# Patient Record
Sex: MALE | Race: ASIAN | NOT HISPANIC OR LATINO | Employment: UNEMPLOYED | URBAN - METROPOLITAN AREA
[De-identification: names, ages, dates, MRNs, and addresses within clinical notes are randomized per-mention and may not be internally consistent; named-entity substitution may affect disease eponyms.]

---

## 2021-04-22 ENCOUNTER — OFFICE VISIT (OUTPATIENT)
Dept: FAMILY MEDICINE | Facility: CLINIC | Age: 41
End: 2021-04-22
Payer: MEDICAID

## 2021-04-22 VITALS
DIASTOLIC BLOOD PRESSURE: 72 MMHG | SYSTOLIC BLOOD PRESSURE: 115 MMHG | HEIGHT: 63 IN | BODY MASS INDEX: 28.21 KG/M2 | HEART RATE: 59 BPM | WEIGHT: 159.2 LBS | OXYGEN SATURATION: 99 % | TEMPERATURE: 98.1 F

## 2021-04-22 DIAGNOSIS — R10.13 EPIGASTRIC PAIN: ICD-10-CM

## 2021-04-22 DIAGNOSIS — K21.9 GASTROESOPHAGEAL REFLUX DISEASE WITHOUT ESOPHAGITIS: Primary | ICD-10-CM

## 2021-04-22 DIAGNOSIS — A04.8 H. PYLORI INFECTION: ICD-10-CM

## 2021-04-22 PROCEDURE — 99204 OFFICE O/P NEW MOD 45 MIN: CPT | Performed by: INTERNAL MEDICINE

## 2021-04-22 PROCEDURE — T1013 SIGN LANG/ORAL INTERPRETER: HCPCS | Mod: GT | Performed by: INTERNAL MEDICINE

## 2021-04-22 RX ORDER — PANTOPRAZOLE SODIUM 40 MG/1
40 TABLET, DELAYED RELEASE ORAL DAILY
Qty: 30 TABLET | Refills: 1 | Status: SHIPPED | OUTPATIENT
Start: 2021-04-22 | End: 2021-12-15

## 2021-04-22 SDOH — HEALTH STABILITY: MENTAL HEALTH: HOW OFTEN DO YOU HAVE A DRINK CONTAINING ALCOHOL?: NOT ASKED

## 2021-04-22 SDOH — HEALTH STABILITY: MENTAL HEALTH: HOW MANY STANDARD DRINKS CONTAINING ALCOHOL DO YOU HAVE ON A TYPICAL DAY?: NOT ASKED

## 2021-04-22 SDOH — HEALTH STABILITY: MENTAL HEALTH: HOW OFTEN DO YOU HAVE 6 OR MORE DRINKS ON ONE OCCASION?: NOT ASKED

## 2021-04-22 ASSESSMENT — PAIN SCALES - GENERAL: PAINLEVEL: EXTREME PAIN (9)

## 2021-04-22 ASSESSMENT — MIFFLIN-ST. JEOR: SCORE: 1537.26

## 2021-04-22 NOTE — PROGRESS NOTES
"    Assessment & Plan     Gastroesophageal reflux disease without esophagitis  Suffering with reflux for a long time  Spicy foods make it worse  No history of any loss of weight  No history of any smoking or alcohol intake  No history of any melena  - pantoprazole (PROTONIX) 40 MG EC tablet; Take 1 tablet (40 mg) by mouth daily  - Helicobacter pylori Antigen Stool; Future    Epigastric pain  If symptoms are not improving after trial of Protonix and if the H. pylori bacteria is negative then we will consider EGD  - pantoprazole (PROTONIX) 40 MG EC tablet; Take 1 tablet (40 mg) by mouth daily  - Helicobacter pylori Antigen Stool; Future      25 minutes spent on the date of the encounter doing chart review, history and exam, documentation and further activities per the note       BMI:   Estimated body mass index is 28.2 kg/m  as calculated from the following:    Height as of this encounter: 1.6 m (5' 3\").    Weight as of this encounter: 72.2 kg (159 lb 3.2 oz).           Return in about 4 weeks (around 5/20/2021).    Robin Mcginnis MD  [facility] KALIA Ross is a 40 year old who presents for the following health issues  accompanied by phone :    HPI     Abdominal/Flank Pain  Onset/Duration: 2 years  Description:   Character: Sharp and Dull ache  Location: epigastric region  Radiation: None  Intensity: severe  Progression of Symptoms:  worsening and constant  Accompanying Signs & Symptoms:  Fever/Chills: YES- chills when its at its worst  Gas/Bloating: YES  Nausea: YES  Vomitting: YES  Diarrhea: YES  Constipation: no  Dysuria or Hematuria: no  History:   Trauma: no  Previous similar pain: no  Previous tests done: none  Precipitating factors:   Does the pain change with:     Food: YES- spicy foods and beer    Bowel Movement: no    Urination: no   Other factors:  no  Therapies tried and outcome: None, Patient would like to get testing done on abdominal today. " "          Review of Systems   Constitutional, HEENT, cardiovascular, pulmonary, gi and gu systems are negative, except as otherwise noted.      Objective    /72 (BP Location: Left arm, Patient Position: Sitting, Cuff Size: Adult Regular)   Pulse 59   Temp 98.1  F (36.7  C) (Oral)   Ht 1.6 m (5' 3\")   Wt 72.2 kg (159 lb 3.2 oz)   SpO2 99%   BMI 28.20 kg/m    Body mass index is 28.2 kg/m .  Physical Exam   GENERAL: healthy, alert and no distress  EYES: Eyes grossly normal to inspection, PERRL and conjunctivae and sclerae normal  RESP: lungs clear to auscultation - no rales, rhonchi or wheezes  CV: regular rate and rhythm, normal S1 S2, no S3 or S4, no murmur, click or rub, no peripheral edema and peripheral pulses strong  ABDOMEN: soft, nontender, no hepatosplenomegaly, no masses and bowel sounds normal  MS: no gross musculoskeletal defects noted, no edema  SKIN: no suspicious lesions or rashes  NEURO: Normal strength and tone, mentation intact and speech normal  PSYCH: mentation appears normal, affect normal/bright                "

## 2021-04-22 NOTE — PATIENT INSTRUCTIONS
Patient Education     Medicines for Acid Reflux  Your healthcare provider has told you that you have acid reflux. This condition causes stomach acid to wash up into your throat. For most people, acid reflux is troubling but not dangerous. But left untreated, it can sometimes damages the esophagus. Medicines can help control acid reflux and limit your risk of future problems.  Medicines for acid reflux  Your healthcare provider may prescribe medicine to help treat your acid reflux. Medicine will be based on your symptoms and any test results. Your provider will explain how to take your medicine. You will also be told about possible side effects.  Reducing stomach acid  Your provider may suggest antacids that you can buy over the counter. Antacids can give fast relief. Or you may be told to take a type of medicine called H2 blockers. These are available over-the-counter and by prescription (for higher doses).  Blocking stomach acid  In more severe cases, your healthcare provider may suggest stronger medicines such as proton pump inhibitors (PPIs). These keep the stomach from making acid. They are often prescribed for long-term use.  Other medicines  In some cases, medicines to reduce or block stomach acid may not work. Then you may be switched to another type of medicine that helps your stomach empty better.  [employer] last reviewed this educational content on 6/1/2019 2000-2021 [employer]. All rights reserved. This information is not intended as a substitute for professional medical care. Always follow your healthcare professional's instructions.           Patient Education     Medicines for GERD  Gastroesophageal reflux disease (GERD) can be treated with medicine. This may be done with a medicine you can buy over the counter. Or with a medicine that your healthcare provider has to prescribe. In some cases, both types may be used. Your provider will tell you what is best for your symptoms.    Antacids  Antacids work to weaken the acid in your stomach. They can give you quick relief. You can buy many of them with no prescription. Antacids can be high in sodium. This may be a problem if you have high blood pressure. Some antacids also have aluminum. This should be avoided if you have long-term (chronic) kidney disease. So check with your provider first. Take antacids only when you need to, as advised by your provider.   Side effects: Constipation, diarrhea. If you take too much medicine, it can cause calcium to build up.    H-2 blockers  These cause the stomach to make less acid. They are often used on demand as symptoms occur. And they are used daily to keep symptoms away. Your provider may prescribe them if antacids don t work for you. You can buy some of them over the counter. These come in a lower dosage.   Side effects: Confusion in older adults.   Proton-pump inhibitors  These also cause the stomach to make less acid. They reduce stomach acid more than H-2 blockers. They may be used for a short time, or longer to treat certain conditions. You can buy some of them over the counter. Or your provider may prescribe them. They help control GERD symptoms.   Side effects: Belly pain, diarrhea, upset stomach. Possible other side effects linked to long-term use and high doses.   Prokinetics  These medicines affect the movement of the digestive tract. They may be advised if your stomach is emptying too slowly. But in most cases they are not advised for treating GERD.   Side effects:Tiredness, depression, anxiety, problems with physical movement, belly cramps, constipation, diarrhea, a jittery feeling.   Medicines to stay away from  Don t take aspirin without your healthcare provider s approval. And don t take a nonsteroidal anti-inflammatory drug (NSAID), such as ibuprofen. These reduce the protective lining of your stomach. This can lead to more GERD symptoms. Check with your provider or pharmacist before  taking a new medicine.   [employer] last reviewed this educational content on 6/1/2019 2000-2021 [employer]. All rights reserved. This information is not intended as a substitute for professional medical care. Always follow your healthcare professional's instructions.

## 2021-04-23 DIAGNOSIS — K21.9 GASTROESOPHAGEAL REFLUX DISEASE WITHOUT ESOPHAGITIS: ICD-10-CM

## 2021-04-23 DIAGNOSIS — R10.13 EPIGASTRIC PAIN: ICD-10-CM

## 2021-04-23 PROCEDURE — 87338 HPYLORI STOOL AG IA: CPT | Performed by: INTERNAL MEDICINE

## 2021-04-23 PROCEDURE — T1013 SIGN LANG/ORAL INTERPRETER: HCPCS | Mod: GT

## 2021-04-26 LAB — H PYLORI AG STL QL IA: POSITIVE

## 2021-04-26 RX ORDER — CLARITHROMYCIN 500 MG
500 TABLET ORAL 2 TIMES DAILY
Qty: 28 TABLET | Refills: 0 | Status: SHIPPED | OUTPATIENT
Start: 2021-04-26 | End: 2021-08-23

## 2021-04-26 RX ORDER — PANTOPRAZOLE SODIUM 40 MG/1
40 TABLET, DELAYED RELEASE ORAL DAILY
Qty: 28 TABLET | Refills: 0 | Status: SHIPPED | OUTPATIENT
Start: 2021-04-26 | End: 2021-08-23

## 2021-04-26 RX ORDER — AMOXICILLIN 500 MG/1
1000 CAPSULE ORAL 2 TIMES DAILY
Qty: 56 CAPSULE | Refills: 0 | Status: SHIPPED | OUTPATIENT
Start: 2021-04-26 | End: 2021-05-10

## 2021-08-23 ENCOUNTER — OFFICE VISIT (OUTPATIENT)
Dept: URGENT CARE | Facility: URGENT CARE | Age: 41
End: 2021-08-23
Payer: COMMERCIAL

## 2021-08-23 VITALS
HEART RATE: 46 BPM | RESPIRATION RATE: 18 BRPM | TEMPERATURE: 97 F | WEIGHT: 157.6 LBS | SYSTOLIC BLOOD PRESSURE: 113 MMHG | DIASTOLIC BLOOD PRESSURE: 77 MMHG | OXYGEN SATURATION: 99 % | BODY MASS INDEX: 27.92 KG/M2

## 2021-08-23 DIAGNOSIS — R10.13 ABDOMINAL PAIN, CHRONIC, EPIGASTRIC: Primary | ICD-10-CM

## 2021-08-23 DIAGNOSIS — G89.29 ABDOMINAL PAIN, CHRONIC, EPIGASTRIC: Primary | ICD-10-CM

## 2021-08-23 DIAGNOSIS — Z86.19 HISTORY OF HELICOBACTER PYLORI INFECTION: ICD-10-CM

## 2021-08-23 PROCEDURE — 99213 OFFICE O/P EST LOW 20 MIN: CPT | Performed by: PHYSICIAN ASSISTANT

## 2021-08-23 ASSESSMENT — ENCOUNTER SYMPTOMS
CARDIOVASCULAR NEGATIVE: 1
CHILLS: 0
WHEEZING: 0
DIARRHEA: 0
FLANK PAIN: 0
FREQUENCY: 0
FEVER: 0
RESPIRATORY NEGATIVE: 1
CHEST TIGHTNESS: 0
FATIGUE: 0
HEMATOCHEZIA: 0
NAUSEA: 0
SHORTNESS OF BREATH: 0
VOMITING: 0
COUGH: 0
HEARTBURN: 1
HEMATURIA: 0
PALPITATIONS: 0
DYSURIA: 0
ABDOMINAL PAIN: 1
CONSTIPATION: 0

## 2021-08-23 ASSESSMENT — PAIN SCALES - GENERAL: PAINLEVEL: EXTREME PAIN (9)

## 2021-08-23 NOTE — PROGRESS NOTES
Arpan Colby is a 40 year old who presents for the following health issues  accompanied by his brother:  HPI   Abdominal/Flank Pain  Onset/Duration: chronic, 3-4years with worsening over the past 1year.  He is wanting a referral to imaging.  Description:   Character: Dull ache, burning  Location: epigastric region, early satiety but no difficulty swallowing  Radiation: None  Intensity: moderate  Progression of Symptoms:  intermittent  Accompanying Signs & Symptoms:  Fever/Chills: no  Gas/Bloating: no  Nausea: no  Vomitting: no  Diarrhea: no  Constipation: no  Dysuria or Hematuria: no  History:   Trauma: no  Previous similar pain: no  Previous tests done: labs and h pylori was positive and treated  Precipitating factors:   Does the pain change with:     Food: YES    Bowel Movement: no    Urination: no   Other factors:  no  Therapies tried and outcome: prevacid with some relief    There is no problem list on file for this patient.    Current Outpatient Medications   Medication     pantoprazole (PROTONIX) 40 MG EC tablet     No current facility-administered medications for this visit.      No Known Allergies    Review of Systems   Constitutional: Negative for chills, fatigue and fever.   Respiratory: Negative.  Negative for cough, chest tightness, shortness of breath and wheezing.    Cardiovascular: Negative.  Negative for chest pain, palpitations and peripheral edema.   Gastrointestinal: Positive for abdominal pain and heartburn. Negative for constipation, diarrhea, hematochezia, nausea and vomiting.   Genitourinary: Negative for discharge, dysuria, flank pain, frequency, hematuria, penile pain, testicular pain and urgency.   All other systems reviewed and are negative.           Objective    /77   Pulse (!) 46   Temp 97  F (36.1  C) (Tympanic)   Resp 18   Wt 71.5 kg (157 lb 9.6 oz)   SpO2 99%   BMI 27.92 kg/m    Body mass index is 27.92 kg/m .  Physical Exam  Vitals and nursing note reviewed.    Constitutional:       General: He is not in acute distress.     Appearance: Normal appearance. He is normal weight. He is not ill-appearing.   Skin:     General: Skin is warm and dry.   Neurological:      Mental Status: He is alert and oriented to person, place, and time.   Psychiatric:         Mood and Affect: Mood normal.         Behavior: Behavior normal.         Thought Content: Thought content normal.         Judgment: Judgment normal.          Assessment/Plan:  Abdominal pain, chronic, epigastric:  This is chronic and stable on prevacid.  He has been treated for h.pylori as well.  Will send to gastroenterology for further evaluation and management and/or endoscopy.  Avoid sour/spicy foods, caffeine, ETOH/tobacco, and NSAIDs as these will irritate her stomach.  Avoid fatty, greasy, oily foods.  Sleep with head of bed elevated and avoid eating prior to bedtime.  Recheck in clinic if symptoms worsen or if symptoms do not improve.   -     Adult Gastro Ref - Consult Only    History of Helicobacter pylori infection  -     Adult Gastro Ref - Consult Only        Nadia Daley PA-C

## 2021-09-16 NOTE — TELEPHONE ENCOUNTER
REFERRAL INFORMATION:    Referring Provider:  Nadia Daley PA-C    Referring [facility]:  [facility]     Reason for Visit/Diagnosis: Abdominal pain      FUTURE VISIT INFORMATION:    Appointment Date: 12/29/2021    Appointment Time: 3 PM      NOTES STATUS DETAILS   OFFICE NOTE from Referring Provider Internal 8/23/2021 Office visit with Nadia Daley PA-C     OFFICE NOTE from Other Specialist Internal 4/22/2021 Office visit with Dr. Robin Mcginnis ( [facility])     HOSPITAL DISCHARGE SUMMARY/  ED VISITS N/A    OPERATIVE REPORT N/A    MEDICATION LIST Internal         ENDOSCOPY  N/A    COLONOSCOPY N/A    ERCP N/A    EUS N/A    STOOL TESTING Internal 4/23/2021   PERTINENT LABS Internal    PATHOLOGY REPORTS (RELATED) N/A    IMAGING (CT, MRI, EGD, MRCP, Small Bowel Follow Through/SBT, MR/CT Enterography) N/A

## 2021-12-15 ENCOUNTER — TELEPHONE (OUTPATIENT)
Dept: GASTROENTEROLOGY | Facility: CLINIC | Age: 41
End: 2021-12-15

## 2021-12-15 ENCOUNTER — OFFICE VISIT (OUTPATIENT)
Dept: GASTROENTEROLOGY | Facility: CLINIC | Age: 41
End: 2021-12-15
Attending: PHYSICIAN ASSISTANT
Payer: COMMERCIAL

## 2021-12-15 VITALS
DIASTOLIC BLOOD PRESSURE: 74 MMHG | SYSTOLIC BLOOD PRESSURE: 118 MMHG | HEART RATE: 59 BPM | OXYGEN SATURATION: 100 % | WEIGHT: 155 LBS | BODY MASS INDEX: 27.46 KG/M2

## 2021-12-15 DIAGNOSIS — R10.13 EPIGASTRIC PAIN: ICD-10-CM

## 2021-12-15 DIAGNOSIS — K21.9 GASTROESOPHAGEAL REFLUX DISEASE WITHOUT ESOPHAGITIS: ICD-10-CM

## 2021-12-15 DIAGNOSIS — Z11.59 ENCOUNTER FOR SCREENING FOR OTHER VIRAL DISEASES: ICD-10-CM

## 2021-12-15 PROCEDURE — 99203 OFFICE O/P NEW LOW 30 MIN: CPT | Performed by: STUDENT IN AN ORGANIZED HEALTH CARE EDUCATION/TRAINING PROGRAM

## 2021-12-15 RX ORDER — PANTOPRAZOLE SODIUM 40 MG/1
40 TABLET, DELAYED RELEASE ORAL DAILY
Qty: 30 TABLET | Refills: 1 | Status: SHIPPED | OUTPATIENT
Start: 2021-12-15 | End: 2022-03-04

## 2021-12-15 ASSESSMENT — PAIN SCALES - GENERAL: PAINLEVEL: EXTREME PAIN (9)

## 2021-12-15 NOTE — PROGRESS NOTES
GI CLINIC VISIT    CC/REFERRING MD:  Nadia See Thuy  REASON FOR CONSULTATION:   Nadia See Thuy for   Chief Complaint   Patient presents with     New Patient     BNT / Abdominal pain       ASSESSMENT/PLAN:    Persistent Epigastric Pain   Hx of Hpylori Infection     41 year old patient with a hx of GERD and recently diagnosed Hpylori s/p triple therapy treatment presents today with report of persistent epigastric pain despite triple therapy treatment an continued PPI use. He reports associated nausea and vomiting. He reports weight loss but denies hematemesis, hematochezia or melena. He states pain is present with or without food.  He denies early satiety, post prandial fullness or bloating at this time. He denies diarrhea or constipation.     He has not been able to make it back to his PCP to recheck his Hpylori for confirmation of eradication.  He otherwise denies any concern at this time.     Given that patient is from [place] and has continues to have persistent epigastric pain, recently positive Hpylori and weight loss which is an alarm symptoms we will proceed with an EGD for further evaluation of patient's symptom and also plan to take Hpylori biopsies at the same time. Plan discussed with patient and patient agreeable with proceeding with an endoscopic evaluation. In the time being patient will continue his PPI which he states provides him with temporary relief.     Plan:  -EGD with plan for Hpylori biopsy  -Continue Protonix  daily       RTC: Return in about 6 months (around 6/15/2022).       30 minutes spent on the date of the encounter performing chart review, history and exam, documentation and further activities as noted above.  .      John Hills MD  Gastroenterology Fellow  Division of Gastroenterology, Hepatology and Nutrition  [facility]  P:         HPI  41 year old patient with a hx of GERD and recently diagnosed Hpylori s/p triple therapy treatment presents today with report  of persistent epigastric pain despite triple therapy treatment an continued PPI use.  He states pain has been ongoing for the last 3 years and has gotten progressively worse and has become concerning for him. he followed up with a primary care provider earlier in the year where he had an Hpylori testing done which resulted negative after which he was treated with triple therapy but has not been able to make it back to his doctor for a repeat testing to ensure eradication. He states his epigastric pain has persisted despite this treatment course.   He reports associated nausea and vomiting. He reports weight loss but denies hematemesis, hematochezia or melena. He states pain is present with or without food.  He denies early satiety, post prandial fullness or bloating at this time. He denies diarrhea or constipation.      ROS:    No fevers or chills  10 lbs weight loss  No blurry vision, double vision or change in vision  No sore throat  No lymphadenopathy  No headache, paraesthesias, or weakness in a limb  No shortness of breath or wheezing  No chest pain or pressure  No arthralgias or myalgias  No rashes or skin changes  No odynophagia or dysphagia  No BRBPR, hematochezia, melena  No dysuria, frequency or urgency  No hot/cold intolerance or polyria  No anxiety or depression    PROBLEM LIST  There are no problems to display for this patient.      PERTINENT PAST MEDICAL HISTORY:  No past medical history on file.    PREVIOUS SURGERIES:  No past surgical history on file.    PREVIOUS ENDOSCOPY:  None documented.     ALLERGIES:   No Known Allergies    PERTINENT MEDICATIONS:    Current Outpatient Medications:      pantoprazole (PROTONIX) 40 MG EC tablet, Take 1 tablet (40 mg) by mouth daily, Disp: 30 tablet, Rfl: 1    SOCIAL HISTORY:  Social History     Socioeconomic History     Marital status: Single     Spouse name: Not on file     Number of children: Not on file     Years of education: Not on file     Highest education  level: Not on file   Occupational History     Not on file   Tobacco Use     Smoking status: Never Smoker     Smokeless tobacco: Current User   Substance and Sexual Activity     Alcohol use: Yes     Drug use: Never     Sexual activity: Not on file   Other Topics Concern     Not on file   Social History Narrative     Not on file     Social Determinants of Health     Financial Resource Strain: Not on file   Food Insecurity: Not on file   Transportation Needs: Not on file   Physical Activity: Not on file   Stress: Not on file   Social Connections: Not on file   Intimate Partner Violence: Not on file   Housing Stability: Not on file       FAMILY HISTORY:  No family history on file.    Past/family/social history reviewed and no changes    PHYSICAL EXAMINATION:  Constitutional: aaox3, cooperative, pleasant, not dyspneic/diaphoretic, no acute distress  Vitals reviewed: /74   Pulse 59   Wt 70.3 kg (155 lb)   SpO2 100%   BMI 27.46 kg/m    Wt:   Wt Readings from Last 2 Encounters:   12/15/21 70.3 kg (155 lb)   08/23/21 71.5 kg (157 lb 9.6 oz)      Eyes: Sclera anicteric/injected  Ears/nose/mouth/throat: Mucus membranes moist, hearing intact  Neck: supple  CV: No edema  Respiratory: Unlabored breathing  Abd: Nondistended, +bs, no hepatosplenomegaly, nontender, no peritoneal signs  Skin: warm, perfused, no jaundice  Psych: Normal affect  MSK: Normal gait    PERTINENT STUDIES:    Orders Only on 04/23/2021   Component Date Value Ref Range Status     Helicobacter pylori Antigen Stool 04/23/2021 Positive* NEG^Negative Final

## 2021-12-15 NOTE — TELEPHONE ENCOUNTER
Screening Questions  1. Are you active on mychart? N    2. What insurance is in the chart? [employer]    2.  Ordering/Referring Provider: John Hills MD    3. BMI 27.8, If greater than 40 review exclusion criteria    4.  Respiratory Screening (If yes to any of the following Hospital setting only):     Do you use daily home oxygen? N  Do you have mod to severe Obstructive Sleep Apnea? N   Do you have Pulmonary Hypertension? N   Do you have UNCONTROLLED asthma? N    5. Have you had a heart or lung transplant (If yes, please review exclusion criteria) ? N    6. Are you currently on dialysis or have chronic kidney disease? N    7. Have you had a stroke or Transient ischemic attack (TIA) within 6 months? N    8. In the past 6 months, have you had any heart related issues including cardiomyopathy or heart attack? N                 If yes, did it require cardiac stenting or other implantable device?      9. Do you have any implantable devices in your body (pacemaker, defib, LVAD)? N    10. Do you take nitroglycerin? If yes, how often? N    11. Are you currently taking any blood thinners?N    12. Are you a diabetic? N    13. (Females) Are you currently pregnant?   If yes, how many weeks?      15. Are you taking any prescription pain medications on a routine schedule? N If yes, MAC sedation.    16. Do you have any chemical dependencies such as alcohol, street drugs, or methadone? NIf yes, MAC sedation.    17. Do you have any history of post-traumatic stress syndrome, severe anxiety or history of psychosis? N    18. Do you transfer independently? Y    19.  Do you have any issues with constipation?     20. Preferred Pharmacy for Pre Prescription [facility]    Scheduling Details    Which Colonoscopy Prep was Sent?:   Procedure Scheduled: EGD  Surgeon: AURY  Date of Procedure: 1/3  Location: [facility]  Caller (Please ask for phone number if not scheduled by patient):       Sedation Type: CS  Conscious Sedation- Needs  for 6 hours  after the procedure  MAC/General-Needs  for 24 hours after procedure    Pre-op Required at [facility] [place], [place] and OR for MAC sedation:   (if yes advise patient they will need a pre-op prior to procedure)      Is patient on blood thinners? -N (If yes- inform patient to follow up with PCP or provider for follow up instructions)     Informed patient they will need an adult  Y  Cannot take any type of public or medical transportation alone    Pre-Procedure Covid test to be completed at [facility] or Externally: 12/30    Confirmed Nurse will call to complete assessment Y    Additional comments:

## 2021-12-15 NOTE — NURSING NOTE
Chief Complaint   Patient presents with     New Patient     BNT / Abdominal pain       Vitals:    12/15/21 1103   BP: 118/74   Pulse: 59   SpO2: 100%   Weight: 70.3 kg (155 lb)       Body mass index is 27.46 kg/m .    Naima Thomson MA

## 2021-12-15 NOTE — PATIENT INSTRUCTIONS
Marco Antonio Mr Durán, it was no nice meeting you today.       -As discussed during your visit today, we will proceed with an upper endoscopy to further evaluate your symptom. We will also plan to take biopsies at the same time to recheck H pylori bacteria.       - We will give you a call with the biopsy results.   -Please continue your Protonix every morning 30 minutes before breakfast     If questions please call  Cheryl Butler        You are scheduled for your upper endoscopy on Jan3   Check in time is 930   [facility]   [address] [place] [address]    You will be sent a letter with the information also

## 2021-12-15 NOTE — NURSING NOTE
Upper endoscopy  Scheduled for patient  Message sent to sign up for my chart   Covid test scheduled  After visit summary given to patient along with verbal instructions via    Patient's brother reads english

## 2021-12-15 NOTE — LETTER
12/15/2021         RE: Lasha Durán  [address]  [facility] [address]        Dear Colleague,    Thank you for referring your patient, Lasha Durán, to the [facility]STRO[facility] [place]. Please see a copy of my visit note below.    GI CLINIC VISIT    CC/REFERRING MD:  Nadia Daley  REASON FOR CONSULTATION:   Nadia Daley for   Chief Complaint   Patient presents with     New Patient     BNT / Abdominal pain       ASSESSMENT/PLAN:    Persistent Epigastric Pain   Hx of Hpylori Infection     41 year old patient with a hx of GERD and recently diagnosed Hpylori s/p triple therapy treatment presents today with report of persistent epigastric pain despite triple therapy treatment an continued PPI use. He reports associated nausea and vomiting. He reports weight loss but denies hematemesis, hematochezia or melena. He states pain is present with or without food.  He denies early satiety, post prandial fullness or bloating at this time. He denies diarrhea or constipation.     He has not been able to make it back to his PCP to recheck his Hpylori for confirmation of eradication.  He otherwise denies any concern at this time.     Given that patient is from [place] and has continues to have persistent epigastric pain, recently positive Hpylori and weight loss which is an alarm symptoms we will proceed with an EGD for further evaluation of patient's symptom and also plan to take Hpylori biopsies at the same time. Plan discussed with patient and patient agreeable with proceeding with an endoscopic evaluation. In the time being patient will continue his PPI which he states provides him with temporary relief.     Plan:  -EGD with plan for Hpylori biopsy  -Continue Protonix  daily       RTC: Return in about 6 months (around 6/15/2022).       30 minutes spent on the date of the encounter performing chart review, history and exam, documentation and further activities as noted  above.  .      John Hills MD  Gastroenterology Fellow  Division of Gastroenterology, Hepatology and Nutrition  [facility]  P:         HPI  41 year old patient with a hx of GERD and recently diagnosed Hpylori s/p triple therapy treatment presents today with report of persistent epigastric pain despite triple therapy treatment an continued PPI use.  He states pain has been ongoing for the last 3 years and has gotten progressively worse and has become concerning for him. he followed up with a primary care provider earlier in the year where he had an Hpylori testing done which resulted negative after which he was treated with triple therapy but has not been able to make it back to his doctor for a repeat testing to ensure eradication. He states his epigastric pain has persisted despite this treatment course.   He reports associated nausea and vomiting. He reports weight loss but denies hematemesis, hematochezia or melena. He states pain is present with or without food.  He denies early satiety, post prandial fullness or bloating at this time. He denies diarrhea or constipation.      ROS:    No fevers or chills  10 lbs weight loss  No blurry vision, double vision or change in vision  No sore throat  No lymphadenopathy  No headache, paraesthesias, or weakness in a limb  No shortness of breath or wheezing  No chest pain or pressure  No arthralgias or myalgias  No rashes or skin changes  No odynophagia or dysphagia  No BRBPR, hematochezia, melena  No dysuria, frequency or urgency  No hot/cold intolerance or polyria  No anxiety or depression    PROBLEM LIST  There are no problems to display for this patient.      PERTINENT PAST MEDICAL HISTORY:  No past medical history on file.    PREVIOUS SURGERIES:  No past surgical history on file.    PREVIOUS ENDOSCOPY:  None documented.     ALLERGIES:   No Known Allergies    PERTINENT MEDICATIONS:    Current Outpatient Medications:      pantoprazole  (PROTONIX) 40 MG EC tablet, Take 1 tablet (40 mg) by mouth daily, Disp: 30 tablet, Rfl: 1    SOCIAL HISTORY:  Social History     Socioeconomic History     Marital status: Single     Spouse name: Not on file     Number of children: Not on file     Years of education: Not on file     Highest education level: Not on file   Occupational History     Not on file   Tobacco Use     Smoking status: Never Smoker     Smokeless tobacco: Current User   Substance and Sexual Activity     Alcohol use: Yes     Drug use: Never     Sexual activity: Not on file   Other Topics Concern     Not on file   Social History Narrative     Not on file     Social Determinants of Health     Financial Resource Strain: Not on file   Food Insecurity: Not on file   Transportation Needs: Not on file   Physical Activity: Not on file   Stress: Not on file   Social Connections: Not on file   Intimate Partner Violence: Not on file   Housing Stability: Not on file       FAMILY HISTORY:  No family history on file.    Past/family/social history reviewed and no changes    PHYSICAL EXAMINATION:  Constitutional: aaox3, cooperative, pleasant, not dyspneic/diaphoretic, no acute distress  Vitals reviewed: /74   Pulse 59   Wt 70.3 kg (155 lb)   SpO2 100%   BMI 27.46 kg/m    Wt:   Wt Readings from Last 2 Encounters:   12/15/21 70.3 kg (155 lb)   08/23/21 71.5 kg (157 lb 9.6 oz)      Eyes: Sclera anicteric/injected  Ears/nose/mouth/throat: Mucus membranes moist, hearing intact  Neck: supple  CV: No edema  Respiratory: Unlabored breathing  Abd: Nondistended, +bs, no hepatosplenomegaly, nontender, no peritoneal signs  Skin: warm, perfused, no jaundice  Psych: Normal affect  MSK: Normal gait    PERTINENT STUDIES:    Orders Only on 04/23/2021   Component Date Value Ref Range Status     Helicobacter pylori Antigen Stool 04/23/2021 Positive* NEG^Negative Final     Again, thank you for allowing me to participate in the care of your patient.      Sincerely,    John  MD Jeana

## 2021-12-16 ENCOUNTER — TELEPHONE (OUTPATIENT)
Dept: GASTROENTEROLOGY | Facility: CLINIC | Age: 41
End: 2021-12-16
Payer: COMMERCIAL

## 2021-12-16 NOTE — LETTER
December 20, 2021      Lasha S Luis Armando  [address] [facility] [address]              Dear Lasha,        Instructions for Your Upper Endoscopy  Your exam is on 1/3/22 Arrival Time: 9:30AM  Please note that your procedure time may change  Check in at: [facility]; [address] [place] [address]. [address], [place] [address]     What is an upper endoscopy?  - This is an exam that checks for problems linked to heartburn, swallowing or belly (abdominal) pain or other symptoms of the upper GI tract. Depending on your symptoms, the doctor will look at your esophagus (food pipe), stomach and the part of the stomach that enters the small intestine.      Getting ready  - You must arrange for an adult to drive you home and stay with you after your exam. This person will need to stay with you for 24 hours unless your provider says otherwise.   - Your exam cannot be done unless you have proper transportation. If you need to use public transportation someone must ride with you.  - Dress in comfortable, loose clothing.  - Bring your insurance card. Leave your purse, billfold, credit cards and other valuables at home.  - Bring a list of your medicines and known allergies. If you have a pacemaker or ICD, please bring your information card.  - We do our best to stay on time, but there may be a delay. Please bring something to pass the time, such as a newspaper or book.     - Important: You must complete all steps before the exam.      Seven days before the exam - Date: 12/27  - Talk to your doctor: If you take blood-thinners (such as Coumadin, Plavix, Xarelto), your prescription or schedule may need to change before the test.  - Continue taking prescribed aspirin; talk to your prescribing doctor with any concerns.     - If you have diabetes: Ask to have your exam early in the morning. Also, ask your doctor if you should change your diet or medicines     One day before the exam - Date: 1/2  -Stop eating all solid  foods at 10 p.m. You may drink clear liquids.      Day of the exam - Date: 1/3  -You may drink clear liquids until 6 hours before your exam.  -You may take all of your morning medicines (except for diabetes pills) as usual with 4 oz. of water up to 6 hours before your test.  -If you take diabetes medicine (pills): do not take them the morning of your test.  -Bring a list of your medicines and known allergies.  -Please arrive with an adult who can take you home after the test: The medicine will make you sleepy. If you do not have a , we may cancel your test.          What are clear liquids?   You may have:  - Water, tea, coffee (no cream)  - Soda pop, Gatorade   - Clear nutrition drinks (Enlive, Resource Breeze)   - Jell-O, Popsicles (no milk or fruit pieces) or sorbet   - Fat-free soup broth or bouillon  - Plain hard cand, such as clear life savers   - Clear juices and fruit-flavored drinks such as apple juice, white grape juice, Hi-C and David-Aid     Do not have:  - Milk or milk products such as ice cream, malts or shakes  - Juices with pulp such as orange, grapefruit, pineapple or tomato juice  - Cream soups of any kind  - Alcohol         During the exam  - The exam lasts from 10 to 20 minutes.   - We will review the risks and benefits of the exam. We will then ask you to sign a consent form.  - We will place a small needle (IV) in your hand or arm. We will give you medicine through the IV to keep you comfortable.   -We will spray a numbing medicine into your throat. This will reduce gagging.   - We will help you swallow a flexible tube (the endoscope). You may feel some discomfort at first. The tube will not get in the way of your breathing.  - You will not be able to talk with the endoscope in your mouth. Use hand signals instead.  - When we put air into your stomach, you may feel full or have mild cramps. We will remove the air at the end of the exam.  - To reduce discomfort, breathe at a slow, even pace.  Try to relax the muscles in your neck and shoulders.  - We may take a small piece of tissue (a biopsy) to test in the lab. It will not hurt. We may also take pictures of your insides.      After the exam  - You will rest in the recovery area until you feel ready to leave. This takes about 30 to 60 minutes.   - We will remove the IV.  - You may burp up air left in your stomach.  - You may feel drowsy or a little dizzy from the medicine.   - Your doctor will discuss your results. You will receive your test results in 7 to 10 business days by letter or MyChart.   - Your throat may feel numb. One hour after the exam, swallow a small amount of cool water. If you can swallow easily, you may go back to your regular diet and medicines.  - Your throat may be sore for the rest of the day. Throat lozenges or ice chips may help.  - Do not drive for 24 hours.     Call us at once if you have:  - Unusual pain or problems swallowing, unusual stomach or chest pain.  - Vomit that looks like coffee grounds or black or bloody stools (bowel movements).  - A fever above 100.6  F (37.5  C) when taken under the tongue.     Test results  - You will receive your results in 7 to 10 business days by phone, letter or MyChart.     Schedule your Covid Test: Your covid test is scheduled on 12/30 @2:45 at the [facility] ([address] [place] [id number]).   Please ensure your COVID test is scheduled within 96 hours or 4 days of your procedure. If you have not been contacted to schedule please call [phone].     For questions or appointments, call:  [facility]   [phone], option 2  Monday through Friday, 8 a.m. to 4:30 p.m.  (If it is after hours, call [phone]. Ask for the GI fellow resident on call.)     You should be aware that your endoscopist may be a part of a study to improve endoscopy procedures.  As part of a study, pictures gathered from your procedure may be stored  and analyzed in a de-identified manner.

## 2021-12-16 NOTE — TELEPHONE ENCOUNTER
Patient scheduled for EGD on 1.3.2022.     Covid test scheduled: 12.30.2021    Arrival time: 0930    Facility location: [place]    Sedation type: MAC    Indication for procedure: Persistent epigastri pain/stool positive Hpylori s/p triple therapy. Pt still with epigastric pain. EGD for evaluation and Hpylori biopsies    Referring provider: John Hills MD    Pre visit planning completed.    Marjan Celestin RN

## 2021-12-20 NOTE — TELEPHONE ENCOUNTER
Pre assessment questions completed for upcoming EGD procedure scheduled on 1.3.2022    COVID test scheduled 12.30.2021    Reviewed procedural arrival time 0930 and facility location [facility].    Designated  policy reviewed. Instructed to have someone stay 24 hours post procedure.     Pt stated that he wanted conscious sedation.  RN informed pt that [facility] does procedure with MAC however pt could be r/s for CS at [facility].  Pt declined and would like to keep his appt as scheduled.    Anticoagulation/blood thinners? no    Electronic implanted devices? no    Reviewed EGD prep instructions with patient.     Patient verbalized understanding and had no questions or concerns at this time.    Marjan Celestin RN

## 2021-12-29 ENCOUNTER — PRE VISIT (OUTPATIENT)
Dept: GASTROENTEROLOGY | Facility: CLINIC | Age: 41
End: 2021-12-29

## 2021-12-30 ENCOUNTER — LAB (OUTPATIENT)
Dept: LAB | Facility: CLINIC | Age: 41
End: 2021-12-30
Attending: INTERNAL MEDICINE
Payer: COMMERCIAL

## 2021-12-30 DIAGNOSIS — Z11.59 ENCOUNTER FOR SCREENING FOR OTHER VIRAL DISEASES: ICD-10-CM

## 2021-12-30 PROCEDURE — U0003 INFECTIOUS AGENT DETECTION BY NUCLEIC ACID (DNA OR RNA); SEVERE ACUTE RESPIRATORY SYNDROME CORONAVIRUS 2 (SARS-COV-2) (CORONAVIRUS DISEASE [COVID-19]), AMPLIFIED PROBE TECHNIQUE, MAKING USE OF HIGH THROUGHPUT TECHNOLOGIES AS DESCRIBED BY CMS-2020-01-R: HCPCS

## 2021-12-30 PROCEDURE — U0005 INFEC AGEN DETEC AMPLI PROBE: HCPCS

## 2021-12-31 LAB — SARS-COV-2 RNA RESP QL NAA+PROBE: NEGATIVE

## 2022-01-03 ENCOUNTER — TRANSFERRED RECORDS (OUTPATIENT)
Dept: HEALTH INFORMATION MANAGEMENT | Facility: CLINIC | Age: 42
End: 2022-01-03
Payer: COMMERCIAL

## 2022-01-03 ENCOUNTER — DOCUMENTATION ONLY (OUTPATIENT)
Dept: GASTROENTEROLOGY | Facility: OUTPATIENT CENTER | Age: 42
End: 2022-01-03
Payer: COMMERCIAL

## 2022-01-03 DIAGNOSIS — R10.13 EPIGASTRIC PAIN: ICD-10-CM

## 2022-01-03 PROCEDURE — 88305 TISSUE EXAM BY PATHOLOGIST: CPT | Mod: TC,ORL | Performed by: INTERNAL MEDICINE

## 2022-01-03 PROCEDURE — 88342 IMHCHEM/IMCYTCHM 1ST ANTB: CPT | Mod: 26 | Performed by: PATHOLOGY

## 2022-01-03 PROCEDURE — 88305 TISSUE EXAM BY PATHOLOGIST: CPT | Mod: 26 | Performed by: PATHOLOGY

## 2022-01-04 ENCOUNTER — LAB REQUISITION (OUTPATIENT)
Dept: LAB | Facility: CLINIC | Age: 42
End: 2022-01-04
Payer: COMMERCIAL

## 2022-01-04 NOTE — LETTER
"January 7, 2022      Lasha Durán  [address] [address]        Dear ,    We are writing to inform you of your test results.    {results letter list:986201}    Resulted Orders   Surgical Pathology Exam   Result Value Ref Range    Case Report       Surgical Pathology Report                         Case: [id number]                                  Authorizing Provider:  Rasheed Richardson MD   Collected:           01/03/2022 10:45 AM          Ordering Location:     [facility]     Received:            01/04/2022 09:19 AM                                 [facility]                                                       Pathologist:           Sammie Jonas MD                                                   Specimens:   A) - Small Intestine, Duodenum, Duodenal biopsy                                                     B) - Stomach, Antrum, Gastric biopsy                                                       Final Diagnosis       A. Duodenal biopsy:  - Duodenal mucosa with normal villous architecture and mildly increased intraepithelial lymphocytes  - Negative for dysplasia or malignancy  - See comment    B. Gastric biopsy:  - Moderate chronic inactive gastritis  - Positive for H. Pylori by immunohistochemistry  - Negative for intestinal metaplasia or dysplasia      Comment       The mildly increased intraepithelial duodenal lymphocytes in part A is a nonspecific finding and can be seen in association with a variety disorders/conditions including early celiac disease, nongluten protein enteropathy, various drugs including NSAIDs, autoimmune disorders, bacterial overgrowth and Helicobacter pylori infection.       Clinical Information       Dyspepsia.  Follow-up H. pylori.  Weight loss      Gross Description       A(). Small Intestine, Duodenum, Duodenal biopsy:  The specimen is received in formalin with proper patient identification, labeled \"duodenum " "biopsy\".  The specimen consists of 5 tan-white soft tissue fragments, 0.2 cm in greatest dimension.  Entirely submitted in A1.     B(). Stomach, Antrum, Gastric biopsy:  The specimen is received in formalin with proper patient identification, labeled \"stomach biopsy\".  The specimen consists of multiple tan-white soft tissue fragments ranging from 0.2 to 0.3 cm in greatest dimension.  Entirely submitted in B1.         Microscopic Description       Microscopic examination is performed.         Performing Labs       The technical component of this testing was completed at [facility]      Case Images         If you have any questions or concerns, please call the clinic at the number listed above.       Sincerely,      Rasheed Richardson MD          "

## 2022-01-06 LAB
PATH REPORT.COMMENTS IMP SPEC: NORMAL
PATH REPORT.FINAL DX SPEC: NORMAL
PATH REPORT.GROSS SPEC: NORMAL
PATH REPORT.MICROSCOPIC SPEC OTHER STN: NORMAL
PATH REPORT.RELEVANT HX SPEC: NORMAL
PHOTO IMAGE: NORMAL

## 2022-01-07 ENCOUNTER — TELEPHONE (OUTPATIENT)
Dept: FAMILY MEDICINE | Facility: CLINIC | Age: 42
End: 2022-01-07
Payer: COMMERCIAL

## 2022-01-07 NOTE — TELEPHONE ENCOUNTER
Rasheed Richardson MD did procedure. RN routed to GI [facility] pool.    Pauline Miller RN, BSN  Gastroenterology Nurse Care Coordinator  Phone: [phone]  Fax: [phone]

## 2022-01-07 NOTE — TELEPHONE ENCOUNTER
Patient is calling to request results of upper GI endoscopy. Routing to GI team to provide interpretation of results and notify patient.       Patient is also looking to establish care as he does not have a primary care provider.    Transferred patient to central scheduling to assist with appointment set up.    Brittney Duncan RN  [facility]

## 2022-01-07 NOTE — RESULT ENCOUNTER NOTE
Called to discuss results, but he could not understand without .     Will have refer to MTM for H pylori treatment.     Will need  for MTM visit.

## 2022-01-13 ENCOUNTER — APPOINTMENT (OUTPATIENT)
Dept: INTERPRETER SERVICES | Facility: CLINIC | Age: 42
End: 2022-01-13
Payer: COMMERCIAL

## 2022-01-13 ENCOUNTER — VIRTUAL VISIT (OUTPATIENT)
Dept: PHARMACY | Facility: CLINIC | Age: 42
End: 2022-01-13
Attending: INTERNAL MEDICINE
Payer: COMMERCIAL

## 2022-01-13 DIAGNOSIS — A04.8 H. PYLORI INFECTION: Primary | ICD-10-CM

## 2022-01-13 PROCEDURE — 99605 MTMS BY PHARM NP 15 MIN: CPT | Performed by: PHARMACIST

## 2022-01-13 PROCEDURE — 99607 MTMS BY PHARM ADDL 15 MIN: CPT | Performed by: PHARMACIST

## 2022-01-13 RX ORDER — METRONIDAZOLE 500 MG/1
500 TABLET ORAL 4 TIMES DAILY
Qty: 56 TABLET | Refills: 0 | Status: SHIPPED | OUTPATIENT
Start: 2022-01-13 | End: 2022-02-03

## 2022-01-13 RX ORDER — BISMUTH SUBSALICYLATE 262 MG/1
2 TABLET, CHEWABLE ORAL
Qty: 112 TABLET | Refills: 0 | Status: SHIPPED | OUTPATIENT
Start: 2022-01-13 | End: 2022-02-03

## 2022-01-13 RX ORDER — TETRACYCLINE HYDROCHLORIDE 500 MG/1
500 CAPSULE ORAL 4 TIMES DAILY
Qty: 56 CAPSULE | Refills: 0 | Status: SHIPPED | OUTPATIENT
Start: 2022-01-13 | End: 2022-02-03

## 2022-01-13 RX ORDER — PANTOPRAZOLE SODIUM 40 MG/1
40 TABLET, DELAYED RELEASE ORAL 2 TIMES DAILY
Qty: 28 TABLET | Refills: 0 | Status: SHIPPED | OUTPATIENT
Start: 2022-01-13 | End: 2022-02-03

## 2022-01-13 NOTE — PROGRESS NOTES
Medication Therapy Management (MTM) Encounter    ASSESSMENT:                            Medication Adherence/Access: No issues identified    H pylori: Lasha would benefit from re-treatment of H pylori given continued positive result. We discussed persistent versus re-infection and consideration for testing those in his household. Would recommend bismuth quadruple therapy for salvage regimen. In the event tetracycline is not available/covered we can use doxycycline. Will use higher dose metronidazole strategy given potential failure of triple therapy. Discussed confirmatory testing via stool antigen to confirm success at least four weeks after completion of the regimen. He is on chronic PPI therapy with pantoprazole, therefore we will plan to use this twice daily during treatment and then he can return to daily dosing afterwards. We reviewed the need to hold this prior to stool antigen re-testing, which I will discuss again with him at follow-up. He is only on pantoprazole at this time, therefore there are no major drug-drug interactions of concern.    PLAN:                            1. Recommend bismuth quadruple therapy x 14 days:   - pantoprazole 40 mg by mouth twice daily (may return to daily dosing after treatment - - clarified that he should not take both pantoprazole prescriptions)   - bismuth subsalicylate 524 mg by mouth four times daily    - tetracycline 500 mg by mouth four times daily    - metronidazole 500 mg by mouth four times daily     -- preference to [facility] for orders, sent per CPA with Dr. Richardson     Follow-up:    -- 7-10 days for treatment check-in (1/20 1:00 PM scheduled)   -- at least four weeks after treatment completion for repeat stool antigen testing     SUBJECTIVE/OBJECTIVE:                          Lasha Durán is a 41 year old male called for an initial visit. He was referred to me from Dr. Richardson.  (ID# [facility] aditya Interpret) was used during today's  visit.  : Ash Garrido     Reason for visit: H pylori therapy    Allergies/ADRs: Reviewed in chart  Tobacco: He reports that he has never smoked. He uses smokeless tobacco.  Alcohol: none    Medication Adherence/Access: no issues reported    H pylori:    Pantoprazole 40 mg daily     Recent EGD with Dr. Richardson with evidence of H pylori. He is unfamiliar with this, which we discussed today. He inquires about whether or not this can be treated, which we also discussed. Per chart review, he had a positive stool antigen test back in April. I asked if he remembers this, and he states that he does and he was able to complete treatment. He was prescribed clarithromycin based triple therapy at that time. He does remember taking some medications before, but he did not get any refills of this.     The [facility]  phone number was given to the patient in the event he is not able to pickup/tolerate the medications. We discussed bismuth quadruple therapy today as a salvage regimen for him including medications, mechanism of action, general dosing and counseling information as well as precautions. We reviewed different types of medication induced rashes that may occur and how to triage them appropriately. We also discussed that pepto bismol may cause the stools/tongue to be darker. We reviewed the interaction between metronidazole and alcohol, though he is not using any alcohol at this time. He notes that he cannot tolerate alcohol, spicy foods, acidic foods and is wondering if he should eat a specific diet while on treatment. We discussed potential interaction between foods high in calcium and doxycycline/tetracycline, but other than that (and alcohol) he can mainly eat what he is able to tolerate.     Notes that five people live at home with him. He is not aware any of them have been tested or treated for H pylori before.     He asks about refills if this is not effective, and we discussed the plan for repeat  testing. We would likely need to use an alternate regimen if he continues to test positive. He is wondering if he should hold the pantoprazole he is using right now. To avoid confusion, I directed him to take the pantoprazole which I will order, and hold his current pantoprazole to avoid triple dosing. He has several questions regarding ulcers which were discussed today.    ----------------    I spent 47 minutes with this patient today. All changes were made via collaborative practice agreement with Dr. Richardson. A copy of the visit note was provided to the patient's provider(s).    The patient declined a summary of these recommendations.     Devi MathewsD, BCACP  MTM Pharmacist   [facility] Gastroenterology and Rheumatology  Phone: [phone]    Telemedicine Visit Details  Type of service:  Telephone visit  Start Time: 11:06 AM  End Time: 11:53 AM  Originating Location (patient location): [place]  Distant Location (provider location):  [facility] MTM     Medication Therapy Recommendations  H. pylori infection    Rationale: Untreated condition - Needs additional medication therapy - Indication   Recommendation: Start Medication   Status: Accepted per CPA   Note: Recommend bismuth quadruple therapy for 14 days for H pylori treatment.

## 2022-01-19 ENCOUNTER — VIRTUAL VISIT (OUTPATIENT)
Dept: FAMILY MEDICINE | Facility: CLINIC | Age: 42
End: 2022-01-19
Payer: COMMERCIAL

## 2022-01-19 DIAGNOSIS — A04.8 H. PYLORI INFECTION: ICD-10-CM

## 2022-01-19 DIAGNOSIS — R07.81 RIB PAIN: Primary | ICD-10-CM

## 2022-01-19 PROCEDURE — 99213 OFFICE O/P EST LOW 20 MIN: CPT | Mod: 95 | Performed by: PREVENTIVE MEDICINE

## 2022-01-19 NOTE — PROGRESS NOTES
"Lasha is a 41 year old who is being evaluated via a billable telephone visit.      What phone number would you like to be contacted at? Home number   How would you like to obtain your AVS? Mail a copy    Assessment & Plan     Rib pain  -it sounds like he is having rib pain but unable to clarify exact location of pain despite using an interpretor  -advised in person visit in order to better understand his symptoms and do an examination     H. pylori infection  -recent endoscopy  -working with MTM on treatment       15 minutes spent on the date of the encounter doing chart review, history and exam, documentation and further activities per the note       BMI:   Estimated body mass index is 27.46 kg/m  as calculated from the following:    Height as of 4/22/21: 1.6 m (5' 3\").    Weight as of 12/15/21: 70.3 kg (155 lb).       Return in about 2 weeks (around 2/2/2022) for with me, in person.    Brianne Dallas MD MPH    [facility]    Arpan Colby is a 41 year old who presents for the following health issues :    HPI       Visit done with [facility] interpretor    Would like to get an X ray done  Pain on the ribs on anterior side and upper abdomen   Travels to the back  Symptoms for 2 weeks  Pain is better now  Intermittent+  No exertional chest pain  No shortness of breath  No dizziness  No triggers  Low back pain as well  Deep pain that travels   This pain is different from when he had to have the endoscopy.       Review of Systems   Constitutional, HEENT, cardiovascular, pulmonary, gi and gu systems are negative, except as otherwise noted.      Objective           Vitals:  No vitals were obtained today due to virtual visit.    Physical Exam   healthy, alert and no distress  PSYCH: Alert and oriented times 3; coherent speech, normal   rate and volume, able to articulate logical thoughts, able   to abstract reason, no tangential thoughts, no hallucinations   or delusions  His affect is " normal  RESP: No cough, no audible wheezing, able to talk in full sentences  Remainder of exam unable to be completed due to telephone visits          Phone call duration: 11 minutes

## 2022-01-19 NOTE — Clinical Note
Please assist patient in setting up an in person visit with me at the next available appointment. Needs Hmong interpretor.   Thank you, Brianne Dallas MD MPH

## 2022-01-20 ENCOUNTER — VIRTUAL VISIT (OUTPATIENT)
Dept: PHARMACY | Facility: CLINIC | Age: 42
End: 2022-01-20
Payer: COMMERCIAL

## 2022-01-20 ENCOUNTER — APPOINTMENT (OUTPATIENT)
Dept: INTERPRETER SERVICES | Facility: CLINIC | Age: 42
End: 2022-01-20
Payer: COMMERCIAL

## 2022-01-20 DIAGNOSIS — A04.8 H. PYLORI INFECTION: Primary | ICD-10-CM

## 2022-01-20 PROCEDURE — 99606 MTMS BY PHARM EST 15 MIN: CPT | Performed by: PHARMACIST

## 2022-01-20 NOTE — PROGRESS NOTES
Medication Therapy Management (MTM) Encounter    ASSESSMENT:                            Medication Adherence/Access: No issues identified    H pylori: Lasha appears to be tolerating treatment at this time. He should finish therapy around 1/31 and therefore will plan for stool testing around 2/28. He was on PPI therapy daily prior to treatment, therefore I will remind him to hold this in anticipation of re-testing closer to that time.     PLAN:                            1. Continue H pylori treatment    -- tentative completion 1/31 and follow-up testing on or after 2/28 (will need to hold pantoprazole for two weeks)    Follow-up: around 1/31 to confirm completion of therapy     SUBJECTIVE/OBJECTIVE:                          Lasha Durán is a 41 year old male called for a follow-up visit. Today is a follow-up from 1/13/22. Visit held with a [employer]  from [employer].    Reason for visit: H pylori follow-up    Allergies/ADRs: Reviewed in chart  Tobacco: He reports that he has never smoked. He uses smokeless tobacco.  Alcohol: not currently using    Medication Adherence/Access: no issues reported    H pylori:   Bismuth subsalicylate 524 mg four times daily  Pantoprazole 40 mg twice daily   Metronidazole 500 mg four times daily  Tetracycline 500 mg four times daily     He confirms he was able to pickup the medications and start treatment. He notes he is not sure how it was going since he just started it. I further clarified that he has taken for two days (1/18). Is aware of avoiding alcohol, but is wondering what else he was supposed to avoid. We discussed not taking both pantoprazole prescriptions and potential interaction between dairy products and tetracycline. He also is concerned with the number of medications that he is taking and that this will be difficult on his kidneys. We discussed that this would be less likely, especially given the short course of therapy.     ----------------    I spent 10  minutes with this patient today. A copy of the visit note was provided to the patient's provider(s).    The patient declined a summary of these recommendations.     Devi MathewsD, BCACP  MTM Pharmacist   [facility]  Phone: [phone]    Telemedicine Visit Details  Type of service:  Telephone visit  Start Time: 1:02 PM  End Time: 1:12 PM  Originating Location (patient location): Home  Distant Location (provider location):  [facility] SPECIALTY MTM     Medication Therapy Recommendations  No medication therapy recommendations to display

## 2022-01-28 ENCOUNTER — OFFICE VISIT (OUTPATIENT)
Dept: FAMILY MEDICINE | Facility: CLINIC | Age: 42
End: 2022-01-28
Payer: COMMERCIAL

## 2022-01-28 VITALS
TEMPERATURE: 97.6 F | DIASTOLIC BLOOD PRESSURE: 78 MMHG | WEIGHT: 154.8 LBS | HEART RATE: 62 BPM | OXYGEN SATURATION: 100 % | BODY MASS INDEX: 27.43 KG/M2 | SYSTOLIC BLOOD PRESSURE: 122 MMHG | HEIGHT: 63 IN

## 2022-01-28 DIAGNOSIS — A04.8 H. PYLORI INFECTION: ICD-10-CM

## 2022-01-28 DIAGNOSIS — R10.32 LLQ ABDOMINAL PAIN: Primary | ICD-10-CM

## 2022-01-28 LAB
ALBUMIN UR-MCNC: NEGATIVE MG/DL
APPEARANCE UR: ABNORMAL
BILIRUB UR QL STRIP: NEGATIVE
COLOR UR AUTO: YELLOW
GLUCOSE UR STRIP-MCNC: NEGATIVE MG/DL
HGB UR QL STRIP: NEGATIVE
KETONES UR STRIP-MCNC: NEGATIVE MG/DL
LEUKOCYTE ESTERASE UR QL STRIP: ABNORMAL
NITRATE UR QL: NEGATIVE
PH UR STRIP: 5.5 [PH] (ref 5–7)
RBC #/AREA URNS AUTO: ABNORMAL /HPF
SP GR UR STRIP: 1.02 (ref 1–1.03)
SQUAMOUS #/AREA URNS AUTO: ABNORMAL /LPF
UROBILINOGEN UR STRIP-ACNC: 0.2 E.U./DL
WBC #/AREA URNS AUTO: ABNORMAL /HPF

## 2022-01-28 PROCEDURE — 81001 URINALYSIS AUTO W/SCOPE: CPT | Performed by: PREVENTIVE MEDICINE

## 2022-01-28 PROCEDURE — 99214 OFFICE O/P EST MOD 30 MIN: CPT | Performed by: PREVENTIVE MEDICINE

## 2022-01-28 ASSESSMENT — MIFFLIN-ST. JEOR: SCORE: 1512.3

## 2022-01-28 ASSESSMENT — PAIN SCALES - GENERAL: PAINLEVEL: MODERATE PAIN (5)
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
## 2022-01-28 NOTE — RESULT ENCOUNTER NOTE
Please send a letter:    Dear Lasha Durán,    Urine sample is not showing any definite infections or blood in the urine. Plan of care and follow up as discussed in clinic.   Please let me know if you have any questions and thank you for choosing [facility].    Regards,    Brianne Dallas MD MPH

## 2022-01-28 NOTE — LETTER
January 31, 2022      Lsaha Durán  [address] [address]        Dear ,    We are writing to inform you of your test results.    Urine sample is not showing any definite infections or blood in the urine. Plan of care and follow up as discussed in clinic.   Please let me know if you have any questions and thank you for choosing [facility].    Resulted Orders   UA Macro with Reflex to Micro and Culture - lab collect   Result Value Ref Range    Color Urine Yellow Colorless, Straw, Light Yellow, Yellow    Appearance Urine Slightly Cloudy (A) Clear    Glucose Urine Negative Negative mg/dL    Bilirubin Urine Negative Negative    Ketones Urine Negative Negative mg/dL    Specific Gravity Urine 1.020 1.003 - 1.035    Blood Urine Negative Negative    pH Urine 5.5 5.0 - 7.0    Protein Albumin Urine Negative Negative mg/dL    Urobilinogen Urine 0.2 0.2, 1.0 E.U./dL    Nitrite Urine Negative Negative    Leukocyte Esterase Urine Trace (A) Negative   Urine Microscopic   Result Value Ref Range    RBC Urine 0-2 0-2 /HPF /HPF    WBC Urine 0-5 0-5 /HPF /HPF    Squamous Epithelials Urine Few (A) None Seen /LPF    Narrative    Urine Culture not indicated       If you have any questions or concerns, please call the clinic at the number listed above.       Sincerely,      Brianne Dallas MD

## 2022-01-31 ENCOUNTER — APPOINTMENT (OUTPATIENT)
Dept: INTERPRETER SERVICES | Facility: CLINIC | Age: 42
End: 2022-01-31
Payer: COMMERCIAL

## 2022-02-03 ENCOUNTER — TELEPHONE (OUTPATIENT)
Dept: PHARMACY | Facility: CLINIC | Age: 42
End: 2022-02-03
Payer: COMMERCIAL

## 2022-02-03 ENCOUNTER — VIRTUAL VISIT (OUTPATIENT)
Dept: INTERPRETER SERVICES | Facility: CLINIC | Age: 42
End: 2022-02-03
Payer: COMMERCIAL

## 2022-02-03 DIAGNOSIS — A04.8 H. PYLORI INFECTION: ICD-10-CM

## 2022-02-03 DIAGNOSIS — A04.8 H. PYLORI INFECTION: Primary | ICD-10-CM

## 2022-02-03 NOTE — TELEPHONE ENCOUNTER
Called Lasha to confirm he was able to complete H pylori treatment, which he did around 1/31/22.     We will plan for re-testing on March 1 or after. Provided reminder that he should stop the pantoprazole two weeks prior to submitting the stool sample. Encouraged him to reach out to me if he has difficulty with symptoms during the hold. He confirms he has returned to taking the pantoprazole daily.    H pylori stool antigen testing ordered per CPA with Dr. Richardson.

## 2022-03-04 DIAGNOSIS — R10.13 EPIGASTRIC PAIN: ICD-10-CM

## 2022-03-04 DIAGNOSIS — K21.9 GASTROESOPHAGEAL REFLUX DISEASE WITHOUT ESOPHAGITIS: ICD-10-CM

## 2022-03-04 RX ORDER — PANTOPRAZOLE SODIUM 40 MG/1
40 TABLET, DELAYED RELEASE ORAL DAILY
Qty: 30 TABLET | Refills: 1 | Status: SHIPPED | OUTPATIENT
Start: 2022-03-04 | End: 2022-11-09

## 2022-03-04 NOTE — TELEPHONE ENCOUNTER
Reason for Call:  Medication or medication refill:    Do you use a [facility]?  Name of the pharmacy and phone number for the current request:  [id number] [phone]    Name of the medication requested: pantoprazole (PROTONIX) 40 mg EC tabs    Other request: Send to Pharmacy    Can we leave a detailed message on this number? YES    Phone number patient can be reached at: Home number on file [phone] (home)    Best Time: Anytime    Call taken on 3/4/2022 at 2:06 PM by Madison Blanton

## 2022-03-04 NOTE — TELEPHONE ENCOUNTER
Prescription approved per [facility] Refill Protocol.    Layla Stevens RN  [facility]

## 2022-04-20 ENCOUNTER — TELEPHONE (OUTPATIENT)
Dept: PHARMACY | Facility: CLINIC | Age: 42
End: 2022-04-20
Payer: COMMERCIAL

## 2022-04-20 NOTE — TELEPHONE ENCOUNTER
Called Lasha to remind him about the H pylori stool testing. The order is still active and he can pickup the stool kit at an [facility] lab. Reminded that it would be best to hold pantoprazole for two weeks prior to submitting the stool testing.

## 2022-05-21 PROCEDURE — 84999 UNLISTED CHEMISTRY PROCEDURE: CPT | Performed by: INTERNAL MEDICINE

## 2022-05-21 PROCEDURE — 87338 HPYLORI STOOL AG IA: CPT | Performed by: INTERNAL MEDICINE

## 2022-05-23 LAB
Lab: NORMAL
PERFORMING LABORATORY: NORMAL
SPECIMEN STATUS: NORMAL
TEST NAME: NORMAL

## 2022-05-26 LAB — MISCELLANEOUS TEST 1 (ARUP): ABNORMAL

## 2022-09-20 ENCOUNTER — TELEPHONE (OUTPATIENT)
Dept: FAMILY MEDICINE | Facility: CLINIC | Age: 42
End: 2022-09-20

## 2022-09-20 NOTE — TELEPHONE ENCOUNTER
Needs RX refill for pantoprazole sent to [facility] on [address].  Please call when refill has been sent.  Thank you.

## 2022-10-21 ENCOUNTER — TELEPHONE (OUTPATIENT)
Dept: PHARMACY | Facility: CLINIC | Age: 42
End: 2022-10-21

## 2022-10-21 DIAGNOSIS — A04.8 H. PYLORI INFECTION: Primary | ICD-10-CM

## 2022-10-21 NOTE — TELEPHONE ENCOUNTER
Contacted with an [email or web address] .    Notified of positive H pylori result (which was for some reason placed under another providers name). Offered appointment for next week to discuss next steps/treatment options. He preferred an in-person appointment. Scheduled for next available in-person appointment on November 9th at 11:00 AM.     I inquired about testing other household members, and he notes that the others in his home were tested and were negative for H pylori.

## 2022-10-24 NOTE — TELEPHONE ENCOUNTER
Devi Gallardo [facility]  You; Rasheed Richardson MD; Luz Meraz, RN 1 hour ago (9:22 AM)     LT  I have really only met with him the two times, but I never got the impression he wasn't taking the medications as directed and he is fairly responsive otherwise.     Okay - I will ask one of the RNCCs to arrange a repeat EGD and let him know he can cancel our appointment for November.      Rasheed Richardson MD T[place]ke, Lindsay Lundell [facility] 1 hour ago (9:13 AM)     BV  Up to date recommends culture with antibiotic sensitivity testing after 2 failed therapies....     Did you get a sense of any compliance issues? If he has been compliant - I say let do EGD to get culture and sensitivities now.       Order EGD.    Called SHAWNA Colby full, unable to leave a message. Called [facility] (emergency contact) and left SHAWNA for pt to call us back.

## 2022-10-25 ENCOUNTER — TELEPHONE (OUTPATIENT)
Dept: GASTROENTEROLOGY | Facility: CLINIC | Age: 42
End: 2022-10-25

## 2022-10-25 NOTE — TELEPHONE ENCOUNTER
Screening Questions  BLUE  KIND OF PREP RED  LOCATION [review exclusion criteria] GREEN  SEDATION TYPE        N Are you active on mychart?       Rasheed Richardson MD in [facility] MED Ordering/Referring Provider?        ARE What type of coverage do you have?      N Have you had a positive covid test in the last 90 days?     27.3 1. BMI  [BMI 40+ - review exclusion criteria]    Y  2. Are you able to give consent for your medical care? [IF NO,RN REVIEW]        N  3. Are you taking any prescription pain medications on a routine schedule?      N  3a. EXTENDED PREP What kind of prescription?     N 4. Do you have any chemical dependencies such as alcohol, street drugs, or methadone?    N 5. Do you have any history of post-traumatic stress syndrome, severe anxiety or history of psychosis?      **If yes 3- 5 , please schedule with MAC sedation.**          IF YES TO ANY 6 - 10 - HOSPITAL SETTING ONLY.     N 6.   Do you need assistance transferring?     N 7.   Have you had a heart or lung transplant?    N 8.   Are you currently on dialysis?   N 9.   Do you use daily home oxygen?   N 10. Do you take nitroglycerin?   10a. N If yes, how often?     11. [FEMALES]  N Are you currently pregnant?    11a. N If yes, how many weeks? [ Greater than 12 weeks, OR NEEDED]    N 12. Do you have Pulmonary Hypertension? *NEED PAC APPT AT [facility]*     N 13. [review exclusion criteria]  Do you have any implantable devices in your body (pacemaker, defib, LVAD)?    N 14. In the past 6 months, have you had any heart related issues including cardiomyopathy or heart attack?     14a. N If yes, did it require cardiac stenting if so when?     N 15. Have you had a stroke or Transient ischemic attack (TIA - aka  mini stroke ) within 6 months?      N 16. Do you have mod to severe Obstructive Sleep Apnea?  [Hospital only - Ok at [place]]    N 17. Do you have SEVERE AND UNCONTROLLED asthma? *NEED PAC APPT AT [facility]*     N 18. Are you currently taking  "any blood thinners?     18a. If yes, inform patient to \"follow up w/ ordering provider for bridging instructions.\"    N 19. Do you take the medication Phentermine?    19a. If yes, \"Hold for 7 days before procedure.  Please consult your prescribing provider if you have questions about holding this medication.\"     N  20. Do you have chronic kidney disease?      N  21. Do you have a diagnosis of diabetes?       22. On a regular basis do you go 3-5 days between bowel movements?      23. Preferred LOCAL Pharmacy for Pre Prescription    [ LIST ONLY ONE PHARMACY]     [id number] - [place], [place] - [address]      - CLOSING REMINDERS -    Informed patient they will need an adult    Cannot take any type of public or medical transportation alone    Conscious Sedation- Needs  for 6 hours after the procedure       MAC/General-Needs  for 24 hours after procedure    Pre-Procedure Covid test to be completed [[facility] PCR Testing Required]    Confirmed Nurse will call to complete assessment       - SCHEDULING DETAILS -       Surgeon    PENDING     Date of Procedure  Upper Endoscopy [EGD]  Type of Procedure Scheduled   MG Location  Which Colonoscopy Prep was Sent?     CS Sedation Type     N PAC / Pre-op Required       Additional comments:   PENDING scheduling.    Pt has a video appt on 10/28. He wants to ask about the need for an EGD at the 10/28 appt before scheduling.    Sent referral letter.   Used , Baudilio.        "

## 2022-10-28 ENCOUNTER — VIRTUAL VISIT (OUTPATIENT)
Dept: FAMILY MEDICINE | Facility: CLINIC | Age: 42
End: 2022-10-28
Payer: COMMERCIAL

## 2022-10-28 DIAGNOSIS — Z91.199 NO-SHOW FOR APPOINTMENT: Primary | ICD-10-CM

## 2022-10-28 ASSESSMENT — PATIENT HEALTH QUESTIONNAIRE - PHQ9: SUM OF ALL RESPONSES TO PHQ QUESTIONS 1-9: 0

## 2022-10-28 NOTE — PROGRESS NOTES
I waited for 8 minutes for the patient to connect to his video visit. Text link was sent 2 times to number provided.  Will close this encounter as a No show.     Brianne Dallas MD MPH         Video-Visit Details    Video Start Time: 8:36 AM    Type of service:  Video Visit    Video End Time:8:45 AM      This patient was a no show for this scheduled appointment.

## 2022-10-28 NOTE — PATIENT INSTRUCTIONS
At [facility], we strive to deliver an exceptional experience to you, every time we see you. If you receive a survey, please complete it as we do value your feedback.  If you have MyChart, you can expect to receive results automatically within 24 hours of their completion.  Your provider will send a note interpreting your results as well.   If you do not have MyChart, you should receive your results in about a week by mail.    Your care team:                            Family Medicine Internal Medicine   MD Jovanny Peña MD Shantel Branch-Fleming, MD Srinivasa Vaka, MD Katya Belousova, PAJIMMY Santillan CNP, MD ([facility]) [facility]   Sean Evans, MD Laine Rivera MD Amelia Massimini APRN DAVE Lind APRN MD Georgi Garcia MD          Clinic hours: Monday - Thursday 7 am-6 pm; Fridays 7 am-5 pm.   Urgent care: Monday - Friday 10 am- 8 pm; Saturday and Sunday 9 am-5 pm.    Clinic: [phone]       [facility]: Monday - Thursday 8 am - 7 pm; Friday 8 am - 6 pm  [facility]: [phone]

## 2022-11-04 ENCOUNTER — TELEPHONE (OUTPATIENT)
Dept: PHARMACY | Facility: CLINIC | Age: 42
End: 2022-11-04

## 2022-11-04 NOTE — TELEPHONE ENCOUNTER
I contacted Lasha to discuss his EGD. We have been trying to reach him, as Dr. Richardson is suggesting we do a repeat EGD with H pylori cultures given two completed attempts with persistent H pylori. He was contacted by our endoscopy scheduling team, but preferred not to schedule until after his 10/28 appointment. He no showed that appointment.    We are scheduled to meet on 11/9. I also called to let him know we can cancel that appointment if he wishes to pursue the EGD.     Unable to reach -- unable to leave message -- no other number on file.

## 2022-11-07 NOTE — TELEPHONE ENCOUNTER
I was able to reach Lasha to discuss EGD referral. He states that he does not wish to go through with the EGD since we already know he has H pylori and would not find anything new. We discussed that this would be to culture out the H pylori to know what antibiotics it is sensitive to, given previous treatment failures. He feels that this is too much workup for the H pylori and would prefer to take the antibiotics like he did last time.    Will keep appointment for this Wednesday. Provided reminder for time and location.

## 2022-11-09 ENCOUNTER — OFFICE VISIT (OUTPATIENT)
Dept: PHARMACY | Facility: CLINIC | Age: 42
End: 2022-11-09
Payer: COMMERCIAL

## 2022-11-09 VITALS — SYSTOLIC BLOOD PRESSURE: 118 MMHG | HEART RATE: 61 BPM | DIASTOLIC BLOOD PRESSURE: 71 MMHG

## 2022-11-09 DIAGNOSIS — A04.8 H. PYLORI INFECTION: Primary | ICD-10-CM

## 2022-11-09 PROCEDURE — 99607 MTMS BY PHARM ADDL 15 MIN: CPT | Performed by: PHARMACIST

## 2022-11-09 PROCEDURE — 99606 MTMS BY PHARM EST 15 MIN: CPT | Performed by: PHARMACIST

## 2022-11-09 RX ORDER — LEVOFLOXACIN 500 MG/1
500 TABLET, FILM COATED ORAL DAILY
Qty: 14 TABLET | Refills: 0 | Status: SHIPPED | OUTPATIENT
Start: 2022-11-09

## 2022-11-09 RX ORDER — AMOXICILLIN 250 MG/1
750 CAPSULE ORAL 3 TIMES DAILY
Qty: 126 CAPSULE | Refills: 0 | Status: SHIPPED | OUTPATIENT
Start: 2022-11-09

## 2022-11-09 NOTE — PROGRESS NOTES
Medication Therapy Management (MTM) Encounter    ASSESSMENT:                            Medication Adherence/Access: No issues identified    H pylori: Lasha would benefit from treatment of H pylori. As below and previously documented, we discussed consideration for culturing H pylori to determine effective antibiotic therapy given two treatment failures, but he prefers to do antibiotic therapy again. We discussed consideration for household testing, given possibility that this could also be re-infection. In the event he remains positive after this, we would again recommend consideration for culturing to determine H pylori sensitivities. Given preference for antibiotic therapy, will plan for levofloxacin triple therapy, as discussed in detail below. Will again plan for eradication testing at least four weeks after treatment completion.    PLAN:                            1. Recommend levofloxacin triple therapy x 14 days:   -- esomeprazole 20 mg twice daily   -- levofloxacin 500 mg by mouth daily   -- amoxicillin  750 mg by mouth by mouth three times daily    -- Orders placed today to patient's preferred pharmacy per CPA with Dr. Richardson    Follow-up:    -- 7-10 days for treatment check-in   -- at least four weeks after treatment for eradication testing    EDUCATION:     We reviewed H pylori modes of transmission today, as well as consideration for household testing. Discussed levofloxacin triple therapy in detail including medications, mechanism of action, side effects/general counseling information, as well as plan for re-testing. Answered questions regarding EGD recommendation/expectations. Emphasized importance of taking medications as prescribed, including taking all medications concurrently. Reviewed risk of tendonitis/tendon rupture with levofloxacin. Contact information provided in the event that he has questions/concerns regarding the medication, including the [employer]  line and my  contact information.    SUBJECTIVE/OBJECTIVE:                          Lasha Durán is a 41 year old male coming in for a follow-up visit.  Today's visit is a follow-up MTM visit from 1/20/2022. [facility] [employer] [place]  Ash is available for the visit.  Our RNCC Yaya is also present for the visit with permission from Lasha.     Reason for visit: H pylori treatment (3rd line)     Allergies/ADRs: None  Tobacco: He reports that he has never smoked. He has quit using smokeless tobacco.  Alcohol: not currently using    Medication Adherence/Access: no issues reported    H pylori:   Pantoprazole 40 mg daily (not taking currently)    Has been treated previously with bismuth quadruple therapy and clarithromycin triple therapy. He was last treated with bismuth quadruple therapy in early 2022, stool antigen testing returned positive 5/21/2022. He does not recall missing any doses nor having any difficulty taking the medication. He is not on any medications or supplements at this time. He confirms as previously discussed that he was able to complete the medications as previously ordered. He is not aware that anyone in his household has been treated/tested for H pylori.     Consideration for EGD was recommended by Dr. Richardson to try and culture H pylori, but he declined this and would prefer to do antibiotic therapy again at this time. He has questions about the culturing and effects of this procedure.    Today's Vitals: /71   Pulse 61      ----------------    I spent 30 minutes with this patient today. All changes were made via collaborative practice agreement with Rasheed Richardson. A copy of the visit note was provided to the patient's provider(s).    The patient was given a summary of these recommendations.     Devi Gallardo PharmD, BCACP  MTM Pharmacist   [facility]astro[facility]   Phone: [phone]       Medication Therapy Recommendations  H. pylori infection    Rationale: Untreated  condition - Needs additional medication therapy - Indication   Recommendation: Start Medication - amoxicillin 250 MG capsule   Status: Accepted per CPA   Note: Recommend levofloxacin triple therapy x 14 days for treatment of H pylori.

## 2022-11-09 NOTE — PATIENT INSTRUCTIONS
"Recommendations from today's MTM visit:                                                       1. Recommend levofloxacin triple therapy x 14 days for treatment of H pylori:   -- esomeprazole 20 mg by mouth twice daily   -- levofloxacin 500 mg by mouth daily   -- amoxicillin 750 mg by mouth by mouth three times daily    Follow-up:    -- 7-10 days for treatment check-in (via phone)    -- at least four weeks after treatment for eradication testing    It was great speaking with you today.  I value your experience and would be very thankful for your time in providing feedback in our clinic survey. In the next few days, you may receive an email or text message from [facility] [employer] with a link to a survey related to your  clinical pharmacist.\"     To schedule another MTM appointment, please call the clinic directly or you may call the MTM scheduling line at [phone] or toll-free at [phone].     My Clinical Pharmacist's contact information:                                                      Please feel free to contact me with any questions or concerns you have.      phone number (Gftst): [phone]     Devi MathewsD, BCACP  MTM Pharmacist   [facility]   Phone: [phone]    "

## 2022-11-21 ENCOUNTER — TELEPHONE (OUTPATIENT)
Dept: PHARMACY | Facility: CLINIC | Age: 42
End: 2022-11-21

## 2022-11-21 NOTE — TELEPHONE ENCOUNTER
Called to check on the status of H pylori treatment with a [facility] . No voicemail box setup to leave a message - not available.

## 2022-12-01 ENCOUNTER — TELEPHONE (OUTPATIENT)
Dept: PHARMACY | Facility: CLINIC | Age: 42
End: 2022-12-01

## 2022-12-01 NOTE — TELEPHONE ENCOUNTER
I contacted Lasha to check-in on the status of H pylori treatment. First call today unsucessful - no mailbox setup. Tried again.

## 2022-12-28 ENCOUNTER — TELEPHONE (OUTPATIENT)
Dept: PHARMACY | Facility: CLINIC | Age: 42
End: 2022-12-28

## 2022-12-28 NOTE — TELEPHONE ENCOUNTER
[facility] . Unable to reach - - no voicemail box setup so unable to leave a message.

## 2023-02-07 NOTE — TELEPHONE ENCOUNTER
We have attempted to contact this patient several times for MTM follow up and were unsuccessful. We will no longer continue to contact this patient. Please refer back to MTM if you believe this patient would continue to benefit from our services. Thank you!    Devi Gallardo PharmD, BCACP  MTM Pharmacist

## 2023-03-20 ENCOUNTER — OFFICE VISIT (OUTPATIENT)
Dept: FAMILY MEDICINE | Facility: CLINIC | Age: 43
End: 2023-03-20
Payer: COMMERCIAL

## 2023-03-20 VITALS
OXYGEN SATURATION: 97 % | RESPIRATION RATE: 16 BRPM | BODY MASS INDEX: 28 KG/M2 | HEIGHT: 64 IN | SYSTOLIC BLOOD PRESSURE: 119 MMHG | HEART RATE: 63 BPM | DIASTOLIC BLOOD PRESSURE: 70 MMHG | WEIGHT: 164 LBS

## 2023-03-20 DIAGNOSIS — Z11.4 SCREENING FOR HIV (HUMAN IMMUNODEFICIENCY VIRUS): ICD-10-CM

## 2023-03-20 DIAGNOSIS — Z00.00 ROUTINE GENERAL MEDICAL EXAMINATION AT A HEALTH CARE FACILITY: Primary | ICD-10-CM

## 2023-03-20 DIAGNOSIS — G44.219 EPISODIC TENSION-TYPE HEADACHE, NOT INTRACTABLE: ICD-10-CM

## 2023-03-20 DIAGNOSIS — Z11.59 NEED FOR HEPATITIS C SCREENING TEST: ICD-10-CM

## 2023-03-20 DIAGNOSIS — Z13.6 CARDIOVASCULAR SCREENING; LDL GOAL LESS THAN 100: ICD-10-CM

## 2023-03-20 DIAGNOSIS — K21.9 GASTROESOPHAGEAL REFLUX DISEASE WITHOUT ESOPHAGITIS: ICD-10-CM

## 2023-03-20 PROCEDURE — 99396 PREV VISIT EST AGE 40-64: CPT | Performed by: FAMILY MEDICINE

## 2023-03-20 PROCEDURE — 99213 OFFICE O/P EST LOW 20 MIN: CPT | Mod: 25 | Performed by: FAMILY MEDICINE

## 2023-03-20 RX ORDER — ACETAMINOPHEN 500 MG
500-1000 TABLET ORAL EVERY 6 HOURS PRN
Qty: 30 TABLET | Refills: 0 | Status: SHIPPED | OUTPATIENT
Start: 2023-03-20

## 2023-03-20 RX ORDER — PANTOPRAZOLE SODIUM 40 MG/1
40 TABLET, DELAYED RELEASE ORAL DAILY
Qty: 30 TABLET | Refills: 2 | Status: SHIPPED | OUTPATIENT
Start: 2023-03-20 | End: 2023-12-13

## 2023-03-20 RX ORDER — CX-024414 0.2 MG/ML
INJECTION, SUSPENSION INTRAMUSCULAR
COMMUNITY
Start: 2022-04-14

## 2023-03-20 ASSESSMENT — ENCOUNTER SYMPTOMS
PALPITATIONS: 0
FREQUENCY: 0
SORE THROAT: 0
CHILLS: 0
COUGH: 0
SHORTNESS OF BREATH: 0
WEAKNESS: 0
EYE PAIN: 0
HEARTBURN: 1
JOINT SWELLING: 0
MYALGIAS: 0
NERVOUS/ANXIOUS: 0
HEMATURIA: 0
ARTHRALGIAS: 0
DIARRHEA: 0
ABDOMINAL PAIN: 1
DIZZINESS: 1
CONSTIPATION: 0
DYSURIA: 0
PARESTHESIAS: 0
HEADACHES: 1
FEVER: 0
HEMATOCHEZIA: 1
NAUSEA: 0

## 2023-03-20 ASSESSMENT — PAIN SCALES - GENERAL: PAINLEVEL: SEVERE PAIN (7)

## 2023-03-20 NOTE — PATIENT INSTRUCTIONS
At [facility], we strive to deliver an exceptional experience to you, every time we see you. If you receive a survey, please complete it as we do value your feedback.  If you have MyChart, you can expect to receive results automatically within 24 hours of their completion.  Your provider will send a note interpreting your results as well.   If you do not have MyChart, you should receive your results in about a week by mail.    Your care team:                            Family Medicine Internal Medicine   MD Jovanny Peña MD Shantel Branch-Fleming, MD Srinivasa Vaka, MD Katya Belousova, AISHA PulidoHillJIMMY Goncalves CNP, MD Pediatrics   Sean Evans, MD Laine Rivera MD Amelia Massimini APRN CNP   Alejandra Lind, APRN CNP MD Georgi Varma MD          Clinic hours: Monday - Thursday 7 am-6 pm; Fridays 7 am-5 pm.   Urgent care: Monday - Friday 10 am- 8 pm; Saturday and Sunday 9 am-5 pm.    Clinic: [phone]       [facility]: Monday - Thursday 8 am - 7 pm; Friday 8 am - 6 pm  [facility]: [phone]     Preventive Health Recommendations  Male Ages 40 to 49    Yearly exam:             See your health care provider every year in order to  o   Review health changes.   o   Discuss preventive care.    o   Review your medicines if your doctor has prescribed any.    You should be tested each year for STDs (sexually transmitted diseases) if you re at risk.     Have a cholesterol test every 5 years.     Have a colonoscopy (test for colon cancer) if someone in your family has had colon cancer or polyps before age 50.     After age 45, have a diabetes test (fasting glucose). If you are at risk for diabetes, you should have this test every 3 years.      Talk with your health care provider about whether or not a prostate cancer screening test (PSA) is  right for you.    Shots: Get a flu shot each year. Get a tetanus shot every 10 years.     Nutrition:    Eat at least 5 servings of fruits and vegetables daily.     Eat whole-grain bread, whole-wheat pasta and brown rice instead of white grains and rice.     Get adequate Calcium and Vitamin D.     Lifestyle    Exercise for at least 150 minutes a week (30 minutes a day, 5 days a week). This will help you control your weight and prevent disease.     Limit alcohol to one drink per day.     No smoking.     Wear sunscreen to prevent skin cancer.     See your dentist every six months for an exam and cleaning.

## 2023-03-20 NOTE — PROGRESS NOTES
SUBJECTIVE:   CC: Lasha is an 42 year old who presents for preventative health visit.     Patient has been advised of split billing requirements and indicates understanding: Yes  HPI            Today's PHQ-2 Score:   PHQ-2 ( 1999 [employer]) 1/28/2022   Q1: Little interest or pleasure in doing things 0   Q2: Feeling down, depressed or hopeless 0   PHQ-2 Score 0       Have you ever done Advance Care Planning? (For example, a Health Directive, POLST, or a discussion with a medical provider or your loved ones about your wishes): No, advance care planning information given to patient to review.  Patient declined advance care planning discussion at this time.    Social History     Tobacco Use     Smoking status: Never     Smokeless tobacco: Former   Substance Use Topics     Alcohol use: Yes    denied alcohol use        No flowsheet data found.No flowsheet data found.    Last PSA: No results found for: PSA    Reviewed orders with patient. Reviewed health maintenance and updated orders accordingly - Yes  Labs reviewed in EPIC    Reviewed and updated as needed this visit by clinical staff   Tobacco  Allergies  Meds              Reviewed and updated as needed this visit by Provider                     Review of Systems  CONSTITUTIONAL: NEGATIVE for fever, chills, change in weight  INTEGUMENTARY/SKIN: NEGATIVE for worrisome rashes, moles or lesions  EYES: NEGATIVE for vision changes or irritation  ENT: NEGATIVE for ear, mouth and throat problems  RESP: NEGATIVE for significant cough or SOB  CV: NEGATIVE for chest pain, palpitations or peripheral edema  GI: NEGATIVE for nausea, abdominal pain, heartburn, or change in bowel habits   male: negative for dysuria, hematuria, decreased urinary stream, erectile dysfunction, urethral discharge  MUSCULOSKELETAL: NEGATIVE for significant arthralgias or myalgia  NEURO: NEGATIVE for weakness, dizziness or paresthesias  PSYCHIATRIC: NEGATIVE for changes in mood or  affect    OBJECTIVE:   There were no vitals taken for this visit.    Physical Exam  GENERAL: healthy, alert and no distress  NECK: no adenopathy, no asymmetry, masses, or scars and thyroid normal to palpation  RESP: lungs clear to auscultation - no rales, rhonchi or wheezes  CV: regular rate and rhythm, normal S1 S2, no S3 or S4, no murmur, click or rub, no peripheral edema and peripheral pulses strong  ABDOMEN: soft, nontender, no hepatosplenomegaly, no masses and bowel sounds normal  MS: no gross musculoskeletal defects noted, no edema    Diagnostic Test Results:  Labs reviewed in Epic    ASSESSMENT/PLAN:   (Z00.00) Routine general medical examination at a health care facility  (primary encounter diagnosis)  -Vitals stable, depression screening normal    (K21.9) Gastroesophageal reflux disease without esophagitis  - Luis Armando wanted refill of pantoprazole.  -History of H. Pylori-received treatment last year.    Plan: pantoprazole (PROTONIX) 40 MG EC tablet      (Z11.4) Screening for HIV (human immunodeficiency virus)  -Pt denied    (Z11.59) Need for hepatitis C screening test  -Pt denied    (Z13.6) CARDIOVASCULAR SCREENING; LDL GOAL LESS THAN 100  -Patient denied labs and vaccines .  Notified the importance of getting labs and vaccines as part of yearly preventative yet patient denied.    (G44.219) episodic tension-type headache, not intractable:  - reported very intermittent headaches on right side.  Denied aura/nausea/vomiting.  -Preferred Tylenol prescription sent to pharmacy.  -Recommended good sleep hygiene, working on anxiety, yearly eye checkups.  -If it occurs more frequent and bothersome, please schedule an appointment in the clinic to discuss further.    Patient has been advised of split billing requirements and indicates understanding: Yes      COUNSELING:   Reviewed preventive health counseling, as reflected in patient instructions       Regular exercise       Healthy diet/nutrition        He  reports that he has never smoked. He has quit using smokeless tobacco.            Georgi Cadet MD  [facility]

## 2023-12-13 DIAGNOSIS — K21.9 GASTROESOPHAGEAL REFLUX DISEASE WITHOUT ESOPHAGITIS: ICD-10-CM

## 2023-12-13 NOTE — TELEPHONE ENCOUNTER
Medication Question or Refill    Contacts         Type Contact Phone/Fax    12/13/2023 10:09 AM CST In Person (Incoming) Lasha Durán (Self)             What medication are you calling about (include dose and sig)?: SEE BELOW     Preferred Pharmacy:  [facility]/pharmacy #[id number] - [place], [place] - [address]  [address] [address]  Phone: [phone] Fax: [phone]      Controlled Substance Agreement on file:   CSA -- Patient Level:    CSA: None found at the patient level.       Who prescribed the medication?: [facility]     Do you need a refill? Yes    When did you use the medication last? DAILY    Patient offered an appointment? No    Do you have any questions or concerns?  Yes: PATIENT IS OUT OF MEDICATION       Okay to leave a detailed message?: Yes at Cell number on file:    Telephone Information:   Mobile [phone]

## 2023-12-17 RX ORDER — PANTOPRAZOLE SODIUM 40 MG/1
40 TABLET, DELAYED RELEASE ORAL DAILY
Qty: 30 TABLET | Refills: 2 | Status: SHIPPED | OUTPATIENT
Start: 2023-12-17